# Patient Record
Sex: FEMALE | Race: WHITE | URBAN - METROPOLITAN AREA
[De-identification: names, ages, dates, MRNs, and addresses within clinical notes are randomized per-mention and may not be internally consistent; named-entity substitution may affect disease eponyms.]

---

## 2022-12-15 ENCOUNTER — OFFICE VISIT (OUTPATIENT)
Dept: URBAN - METROPOLITAN AREA CLINIC 88 | Facility: CLINIC | Age: 47
End: 2022-12-15
Payer: COMMERCIAL

## 2022-12-15 ENCOUNTER — WEB ENCOUNTER (OUTPATIENT)
Dept: URBAN - METROPOLITAN AREA CLINIC 88 | Facility: CLINIC | Age: 47
End: 2022-12-15

## 2022-12-15 ENCOUNTER — DASHBOARD ENCOUNTERS (OUTPATIENT)
Age: 47
End: 2022-12-15

## 2022-12-15 ENCOUNTER — LAB OUTSIDE AN ENCOUNTER (OUTPATIENT)
Dept: URBAN - METROPOLITAN AREA CLINIC 88 | Facility: CLINIC | Age: 47
End: 2022-12-15

## 2022-12-15 VITALS — WEIGHT: 293 LBS | HEIGHT: 66 IN | TEMPERATURE: 97.3 F | BODY MASS INDEX: 47.09 KG/M2

## 2022-12-15 DIAGNOSIS — R93.2 ABNORMAL CT OF LIVER: ICD-10-CM

## 2022-12-15 DIAGNOSIS — R74.8 ELEVATED LIVER ENZYMES: ICD-10-CM

## 2022-12-15 DIAGNOSIS — R16.0 LIVER MASS, LEFT LOBE: ICD-10-CM

## 2022-12-15 DIAGNOSIS — K21.9 GASTROESOPHAGEAL REFLUX DISEASE, UNSPECIFIED WHETHER ESOPHAGITIS PRESENT: ICD-10-CM

## 2022-12-15 DIAGNOSIS — E61.1 IRON DEFICIENCY: ICD-10-CM

## 2022-12-15 DIAGNOSIS — Z98.890 HISTORY OF GASTRIC SURGERY: ICD-10-CM

## 2022-12-15 DIAGNOSIS — Z86.010 PERSONAL HISTORY OF COLONIC POLYPS: ICD-10-CM

## 2022-12-15 PROBLEM — 235595009: Status: ACTIVE | Noted: 2022-12-15

## 2022-12-15 PROBLEM — 428283002: Status: ACTIVE | Noted: 2022-12-15

## 2022-12-15 PROCEDURE — 99205 OFFICE O/P NEW HI 60 MIN: CPT | Performed by: NURSE PRACTITIONER

## 2022-12-15 RX ORDER — LEVOTHYROXINE SODIUM 150 UG/1
1 TABLET IN THE MORNING ON AN EMPTY STOMACH TABLET ORAL ONCE A DAY
Status: ACTIVE | COMMUNITY

## 2022-12-15 RX ORDER — CLONAZEPAM 2 MG/1
1 TABLET TABLET ORAL ONCE A DAY
Status: ACTIVE | COMMUNITY

## 2022-12-15 RX ORDER — MELOXICAM 15 MG/1
1 TABLET TABLET ORAL ONCE A DAY
Status: ACTIVE | COMMUNITY

## 2022-12-15 RX ORDER — ALPRAZOLAM 0.5 MG/1
1 TABLET TABLET ORAL TWICE A DAY
Status: ACTIVE | COMMUNITY

## 2022-12-15 RX ORDER — DEXLANSOPRAZOLE 60 MG/1
1 CAPSULE CAPSULE, DELAYED RELEASE ORAL ONCE A DAY
Status: ACTIVE | COMMUNITY

## 2022-12-15 RX ORDER — VENLAFAXINE HYDROCHLORIDE 150 MG/1
1 CAPSULE WITH FOOD CAPSULE, EXTENDED RELEASE ORAL ONCE A DAY
Status: ACTIVE | COMMUNITY

## 2022-12-15 RX ORDER — DEXLANSOPRAZOLE 60 MG/1
1 CAPSULE CAPSULE, DELAYED RELEASE ORAL ONCE A DAY
OUTPATIENT

## 2022-12-15 NOTE — HPI-TODAY'S VISIT:
Presents as a hospital follow up to discuss abnormal CTA of abdomen.  Presented to ED at [facility] on 12/04/2022 due to c/o melena.  Patient underwent duodenal switch with hiatal hernia repair in November 15, 2022 at [facility] by Dr. Fritz.  Patient was admitted for total of 4 days due to change in respiratory status, before being discharged home.  Was started on oral iron tablets after surgery.  Denies hx of of anemia, melena prior to this, hx of PUD, or excessive use of NSAIDs.   Underwent EGD on 10/05/2022 by Dr. Huerta with normal findings voiced.  Stopped use of oral tablets x 1 week with stools returning to normal.  Remains on Dexilant 60 mg daily.  Has lost 20 lbs since surgery.   During ED visit at [facility] imaging was obtained.  CTA 12/04/2022 revealed signs of cirrhosis with a mass in left hepatic lobe measuring 5.7 cm, changes of gastric bypass, and normal spleen. Labs:  Hgb 11.3, MCV 76.4, , AST 46, ALT 52, Alk Phos 63, TB 0.5, INR 1.13, Sodium 136, INR 1.13.  Stool was positive for hemoccult stool. Denies prior hx of alcohol abuse, hx of liver disease, family hx of liver disease or prior knowledge of elevated LFTs. Last colonoscopy was over 9 years ago by Justin Galan with polyps removed.  Recommended procedure be repeated in 10 years.

## 2022-12-19 ENCOUNTER — TELEPHONE ENCOUNTER (OUTPATIENT)
Dept: URBAN - METROPOLITAN AREA CLINIC 70 | Facility: CLINIC | Age: 47
End: 2022-12-19

## 2022-12-19 ENCOUNTER — LAB OUTSIDE AN ENCOUNTER (OUTPATIENT)
Dept: URBAN - METROPOLITAN AREA CLINIC 70 | Facility: CLINIC | Age: 47
End: 2022-12-19

## 2022-12-20 ENCOUNTER — TELEPHONE ENCOUNTER (OUTPATIENT)
Dept: URBAN - METROPOLITAN AREA CLINIC 105 | Facility: CLINIC | Age: 47
End: 2022-12-20

## 2022-12-20 ENCOUNTER — WEB ENCOUNTER (OUTPATIENT)
Dept: URBAN - METROPOLITAN AREA CLINIC 88 | Facility: CLINIC | Age: 47
End: 2022-12-20

## 2023-01-03 ENCOUNTER — OFFICE VISIT (OUTPATIENT)
Dept: URBAN - METROPOLITAN AREA MEDICAL CENTER 33 | Facility: MEDICAL CENTER | Age: 48
End: 2023-01-03
Payer: COMMERCIAL

## 2023-01-03 DIAGNOSIS — K31.89 ACQUIRED DEFORMITY OF DUODENUM: ICD-10-CM

## 2023-01-03 DIAGNOSIS — K74.00 FIBROSIS OF LIVER: ICD-10-CM

## 2023-01-03 DIAGNOSIS — K72.90: ICD-10-CM

## 2023-01-03 LAB
ACTIN (SMOOTH MUSCLE) ANTIBODY: 36
AFP, SERUM, TUMOR MARKER: 1.8
ALBUMIN: 4
ALKALINE PHOSPHATASE: 106
ALPHA-1-ANTITRYPSIN, SERUM: 165
ALT (SGPT): 89
ANA DIRECT: NEGATIVE
AST (SGOT): 62
BILIRUBIN, DIRECT: 0.17
BILIRUBIN, TOTAL: 0.5
FERRITIN, SERUM: 20
FOLATE (FOLIC ACID), SERUM: 7.5
HBSAG SCREEN: NEGATIVE
HCV AB: >11
HCV LOG10: (no result)
HEP A AB, IGM: NEGATIVE
HEP B CORE AB, IGM: NEGATIVE
HEPATITIS C QUANTITATION: (no result)
INTERPRETATION:: (no result)
IRON BIND.CAP.(TIBC): 361
IRON SATURATION: 9
IRON: 34
MITOCHONDRIAL (M2) ANTIBODY: <20
PROTEIN, TOTAL: 7
TEST INFORMATION:: (no result)
TRANSFERRIN: 280
UIBC: 327
VITAMIN B12: 1954

## 2023-01-03 PROCEDURE — 43242 EGD US FINE NEEDLE BX/ASPIR: CPT | Performed by: INTERNAL MEDICINE

## 2023-01-03 PROCEDURE — 43239 EGD BIOPSY SINGLE/MULTIPLE: CPT | Performed by: INTERNAL MEDICINE

## 2023-01-10 ENCOUNTER — TELEPHONE ENCOUNTER (OUTPATIENT)
Dept: URBAN - METROPOLITAN AREA CLINIC 105 | Facility: CLINIC | Age: 48
End: 2023-01-10

## 2023-01-18 ENCOUNTER — OFFICE VISIT (OUTPATIENT)
Dept: URBAN - METROPOLITAN AREA CLINIC 88 | Facility: CLINIC | Age: 48
End: 2023-01-18